# Patient Record
(demographics unavailable — no encounter records)

---

## 2025-02-27 NOTE — HISTORY OF PRESENT ILLNESS
[FreeTextEntry6] : 17 yo f here for pt  onset sa july 2025 2 lifetime male partners last sa 2/15 without condom lmp 1/27 never used hormonal bcm no urinary or gyn sx  had sti testing  had gonorrhea in July and was treated

## 2025-02-27 NOTE — DISCUSSION/SUMMARY
[FreeTextEntry1] : bhh and bmi obtained and reviewed; anticipatory guidance provided sti screening: urine gc/chlamydia ordered counseled on safe sex: advised 100% condom use advised hormonal bcm; pt will rtc in 2 days for lab results and fp counseling

## 2025-05-06 NOTE — DISCUSSION/SUMMARY
[FreeTextEntry1] : ucg negative sti screening: urine gc/chlamydia ordered counseled on safe sex: advised 100% condom use advised hormonal bcm; pt will rtc in 2 days for lab results and fp counseling.

## 2025-05-06 NOTE — HISTORY OF PRESENT ILLNESS
[FreeTextEntry6] :  15 yo f here for pt onset sa july 2025 2 lifetime male partners last sa 4/4 without condom using condoms most of time states shes been having intermittent spotting over past month no abd pain never used hormonal bcm no urinary or gyn sx